# Patient Record
Sex: MALE | HISPANIC OR LATINO
[De-identification: names, ages, dates, MRNs, and addresses within clinical notes are randomized per-mention and may not be internally consistent; named-entity substitution may affect disease eponyms.]

---

## 2020-12-22 PROBLEM — Z00.00 ENCOUNTER FOR PREVENTIVE HEALTH EXAMINATION: Status: ACTIVE | Noted: 2020-12-22

## 2020-12-31 ENCOUNTER — APPOINTMENT (OUTPATIENT)
Dept: NEPHROLOGY | Facility: CLINIC | Age: 55
End: 2020-12-31
Payer: COMMERCIAL

## 2020-12-31 ENCOUNTER — LABORATORY RESULT (OUTPATIENT)
Age: 55
End: 2020-12-31

## 2020-12-31 VITALS — HEART RATE: 80 BPM | SYSTOLIC BLOOD PRESSURE: 127 MMHG | DIASTOLIC BLOOD PRESSURE: 66 MMHG

## 2020-12-31 VITALS — WEIGHT: 189.6 LBS

## 2020-12-31 DIAGNOSIS — Z87.891 PERSONAL HISTORY OF NICOTINE DEPENDENCE: ICD-10-CM

## 2020-12-31 DIAGNOSIS — Z72.89 OTHER PROBLEMS RELATED TO LIFESTYLE: ICD-10-CM

## 2020-12-31 DIAGNOSIS — I10 ESSENTIAL (PRIMARY) HYPERTENSION: ICD-10-CM

## 2020-12-31 DIAGNOSIS — Z84.1 FAMILY HISTORY OF DISORDERS OF KIDNEY AND URETER: ICD-10-CM

## 2020-12-31 DIAGNOSIS — R00.2 PALPITATIONS: ICD-10-CM

## 2020-12-31 DIAGNOSIS — N18.30 CHRONIC KIDNEY DISEASE, STAGE 3 UNSPECIFIED: ICD-10-CM

## 2020-12-31 PROCEDURE — 93000 ELECTROCARDIOGRAM COMPLETE: CPT

## 2020-12-31 PROCEDURE — 99204 OFFICE O/P NEW MOD 45 MIN: CPT | Mod: 25

## 2020-12-31 PROCEDURE — 99072 ADDL SUPL MATRL&STAF TM PHE: CPT

## 2020-12-31 PROCEDURE — 36415 COLL VENOUS BLD VENIPUNCTURE: CPT

## 2020-12-31 RX ORDER — AMLODIPINE BESYLATE 10 MG/1
10 TABLET ORAL
Qty: 90 | Refills: 3 | Status: ACTIVE | COMMUNITY
Start: 2020-12-31

## 2020-12-31 NOTE — ASSESSMENT
[FreeTextEntry1] : # Decreased kidney function and possible CKD stage 3.\par * This is likeliest due to effect of benicar and HCTZ.\par * Recheck labs, including U/A, urine protein quantification, and monoclonal protein evaluation. \par * The patient has been counseled that chronic kidney disease is a significant condition and regular office followup with me (at least every 4 months for now) is essential for monitoring, and that it is their responsibility to make a follow up appointment.\par * A counseling information sheet has been given (currently or previously, in-person or electronically). All their questions were answered.\par * The patient has been counseled never to stop taking their medications without discussing it with me or another doctor.\par * The patient has been counseled on risk of acute renal failure and instructed to immediately call and speak with me or go immediately to ER with any severe symptoms, nausea, vomiting, diarrhea, chest pain, or shortness of breath.\par * The patient has been counseled on avoiding NSAIDs.\par \par # Palpitations.\par * Check EKG. NSR. No extra beats. \par * Cardiac evaluation advised. They were instructed that this referral is important for their health and that it is their responsibility to make and keep this appointment. All their questions were answered. \par \par # HTN controlled.\par * The patient's blood pressure was checked with the [employer] [id number] using the SPRINT trial protocol after sitting quietly in an empty room with arm supported, back supported, and feet on the floor for 5 minutes. The average of 3 readings were taken.\par * A counseling information sheet has been given (currently or previously, in-person or electronically). All their questions were answered.\par * The patient has been counseled to check their BP at home with an automatic arm cuff, write down the readings, and reach me directly on the phone immediately if they are persistently > 180 systolic or if SBP is less than 100 or if lightheadedness develops. They were counseled to bring in all blood pressure readings and medications next visit.\par * The patient has been counseled that they have a a significant medical condition and regular office followup (at least every 4 months for now) is essential for monitoring, and that it is their responsibility to make follow up appointments.\par * The patient also has been counseled that they must never stop or change any medications without discussing this with me (or another physician). \par

## 2020-12-31 NOTE — CONSULT LETTER
[FreeTextEntry1] : Dear Dr. Raygoza,\par \par I had the pleasure of seeing Nelson Bertrand in consultation for kidney disease. My note is attached.\par \par Thank you for the opportunity to participate in the care of your patient.\par \par Please call me on my cell phone at [phone] or in the office at [phone] if you have any questions.\par \par Best regards,\par \par Joaquín\par \par Joaquín Figueroa MD, FACP, FASN\par [address], [place]\par [email or web address]\par Office: [phone]\par Mobile: [phone]

## 2020-12-31 NOTE — HISTORY OF PRESENT ILLNESS
[FreeTextEntry1] : Kindly referred by Dr. Pratik Raygoza for CKD. He is here with his daughter, who was also provided counseling. A separate  was recommended and they declined. Her works with planes at [employer]. \par \par * The previous records were reviewed and summarized. Seen by Dr. Raygoza in 2/20, at which time his SBP was 170. Olmesartan/HCTZ started with improvement in SBP to 130s in 3/20. However, he developed itching with rash on arms (raised white bumps)  and dizziness but continued the medication. In 12/20, his SCr increased to 1.39 and olmesartan/HCTZ was stopped. Amlodipine 10 mg was started. Itching, rash, and dizziness resolved completely.\par \par * Renal ultrasound 12/11/20: 9.5/10.1, no hydro or echogenicity. 2.4 cm simple cyst left kidney.\par \par * The patient denies exposure to chronic NSAIDs, chronic PPIs, green smoothies, creatine, or herbal supplements.  The patient denies a history of kidney stones or UTIs.  No significant nocturia. They are unaware of proteinuria or hematuria. His mother may have had diabetic kidney disease. \par \par * He doesn't check BP at home. Compliant. He puts salt on food. No LE edema on amlodipine\par \par He occasionally has a "jolt" or extra beat in his heart which occurs only several times monthly but no CP, SOB, or syncope. \par  \par \par

## 2021-01-04 LAB
25(OH)D3 SERPL-MCNC: 37 NG/ML
ALBUMIN MFR SERPL ELPH: 61.1 %
ALBUMIN SERPL ELPH-MCNC: 4.9 G/DL
ALBUMIN SERPL-MCNC: 4.8 G/DL
ALBUMIN/GLOB SERPL: 1.6 RATIO
ALBUPE: 25 %
ALP BLD-CCNC: 60 U/L
ALPHA1 GLOB MFR SERPL ELPH: 4.3 %
ALPHA1 GLOB SERPL ELPH-MCNC: 0.3 G/DL
ALPHA1UPE: 36 %
ALPHA2 GLOB MFR SERPL ELPH: 8.5 %
ALPHA2 GLOB SERPL ELPH-MCNC: 0.7 G/DL
ALPHA2UPE: 19.1 %
ALT SERPL-CCNC: 35 U/L
ANION GAP SERPL CALC-SCNC: 18 MMOL/L
APPEARANCE: CLEAR
AST SERPL-CCNC: 25 U/L
B-GLOBULIN MFR SERPL ELPH: 13.7 %
B-GLOBULIN SERPL ELPH-MCNC: 1.1 G/DL
BASOPHILS # BLD AUTO: 0.05 K/UL
BASOPHILS NFR BLD AUTO: 0.5 %
BETAUPE: 14.7 %
BILIRUB SERPL-MCNC: 0.2 MG/DL
BILIRUBIN URINE: NEGATIVE
BLOOD URINE: NEGATIVE
BUN SERPL-MCNC: 15 MG/DL
CALCIUM SERPL-MCNC: 10 MG/DL
CALCIUM SERPL-MCNC: 10 MG/DL
CHLORIDE SERPL-SCNC: 105 MMOL/L
CO2 SERPL-SCNC: 19 MMOL/L
COLOR: YELLOW
CREAT SERPL-MCNC: 1.27 MG/DL
CREAT SPEC-SCNC: 327 MG/DL
CREAT SPEC-SCNC: 327 MG/DL
CREAT/PROT UR: 0 RATIO
CYSTATIN C SERPL-MCNC: 0.89 MG/L
DEPRECATED KAPPA LC FREE/LAMBDA SER: 1.27 RATIO
EOSINOPHIL # BLD AUTO: 0.13 K/UL
EOSINOPHIL NFR BLD AUTO: 1.3 %
GAMMA GLOB FLD ELPH-MCNC: 1 G/DL
GAMMA GLOB MFR SERPL ELPH: 12.4 %
GAMMAUPE: 5.2 %
GFR/BSA.PRED SERPLBLD CYS-BASED-ARV: 93 ML/MIN
GLUCOSE QUALITATIVE U: NEGATIVE
GLUCOSE SERPL-MCNC: 105 MG/DL
HCT VFR BLD CALC: 37.9 %
HGB BLD-MCNC: 12 G/DL
IGA 24H UR QL IFE: NORMAL
IGA SER QL IEP: 276 MG/DL
IGG SER QL IEP: 993 MG/DL
IGM SER QL IEP: 102 MG/DL
IMM GRANULOCYTES NFR BLD AUTO: 0.3 %
INTERPRETATION SERPL IEP-IMP: NORMAL
KAPPA LC 24H UR QL: NORMAL
KAPPA LC CSF-MCNC: 1.03 MG/DL
KAPPA LC SERPL-MCNC: 1.31 MG/DL
KETONES URINE: NEGATIVE
LEUKOCYTE ESTERASE URINE: NEGATIVE
LYMPHOCYTES # BLD AUTO: 2.3 K/UL
LYMPHOCYTES NFR BLD AUTO: 23.3 %
M PROTEIN SPEC IFE-MCNC: NORMAL
MAN DIFF?: NORMAL
MCHC RBC-ENTMCNC: 30.8 PG
MCHC RBC-ENTMCNC: 31.7 GM/DL
MCV RBC AUTO: 97.2 FL
MICROALBUMIN 24H UR DL<=1MG/L-MCNC: 2 MG/DL
MICROALBUMIN/CREAT 24H UR-RTO: 6 MG/G
MONOCYTES # BLD AUTO: 0.8 K/UL
MONOCYTES NFR BLD AUTO: 8.1 %
NEUTROPHILS # BLD AUTO: 6.56 K/UL
NEUTROPHILS NFR BLD AUTO: 66.5 %
NITRITE URINE: NEGATIVE
PARATHYROID HORMONE INTACT: 40 PG/ML
PH URINE: 5
PHOSPHATE SERPL-MCNC: 3 MG/DL
PLATELET # BLD AUTO: 343 K/UL
POTASSIUM SERPL-SCNC: 4.6 MMOL/L
PROT PATTERN 24H UR ELPH-IMP: NORMAL
PROT SERPL-MCNC: 7.8 G/DL
PROT UR-MCNC: 14 MG/DL
PROTEIN URINE: NORMAL
RBC # BLD: 3.9 M/UL
RBC # FLD: 13.5 %
SODIUM SERPL-SCNC: 143 MMOL/L
SPECIFIC GRAVITY URINE: 1.02
UROBILINOGEN URINE: NORMAL
WBC # FLD AUTO: 9.87 K/UL

## 2021-06-24 ENCOUNTER — APPOINTMENT (OUTPATIENT)
Dept: NEPHROLOGY | Facility: CLINIC | Age: 56
End: 2021-06-24